# Patient Record
Sex: FEMALE | Race: ASIAN | URBAN - METROPOLITAN AREA
[De-identification: names, ages, dates, MRNs, and addresses within clinical notes are randomized per-mention and may not be internally consistent; named-entity substitution may affect disease eponyms.]

---

## 2023-01-05 PROBLEM — Z00.00 ENCOUNTER FOR PREVENTIVE HEALTH EXAMINATION: Status: ACTIVE | Noted: 2023-01-05

## 2023-02-01 ENCOUNTER — EMERGENCY (EMERGENCY)
Facility: HOSPITAL | Age: 38
LOS: 1 days | Discharge: HOME | End: 2023-02-01
Attending: EMERGENCY MEDICINE
Payer: MEDICAID

## 2023-02-01 VITALS
TEMPERATURE: 98 F | OXYGEN SATURATION: 98 % | RESPIRATION RATE: 18 BRPM | WEIGHT: 134.92 LBS | HEART RATE: 81 BPM | SYSTOLIC BLOOD PRESSURE: 118 MMHG | HEIGHT: 63.39 IN | DIASTOLIC BLOOD PRESSURE: 73 MMHG

## 2023-02-01 LAB
APTT BLD: 28.1 SEC — SIGNIFICANT CHANGE UP (ref 27.5–35.5)
BASOPHILS # BLD AUTO: 0.01 K/UL — SIGNIFICANT CHANGE UP (ref 0–0.2)
BASOPHILS NFR BLD AUTO: 0.1 % — SIGNIFICANT CHANGE UP (ref 0–2)
BLD GP AB SCN SERPL QL: NEGATIVE — SIGNIFICANT CHANGE UP
EOSINOPHIL # BLD AUTO: 0.13 K/UL — SIGNIFICANT CHANGE UP (ref 0–0.5)
EOSINOPHIL NFR BLD AUTO: 1.8 % — SIGNIFICANT CHANGE UP (ref 0–6)
HCT VFR BLD CALC: 38.3 % — SIGNIFICANT CHANGE UP (ref 34.5–45)
HGB BLD-MCNC: 12.8 G/DL — SIGNIFICANT CHANGE UP (ref 11.5–15.5)
IMM GRANULOCYTES NFR BLD AUTO: 0.1 % — SIGNIFICANT CHANGE UP (ref 0–0.9)
INR BLD: 0.94 RATIO — SIGNIFICANT CHANGE UP (ref 0.88–1.16)
LYMPHOCYTES # BLD AUTO: 2.08 K/UL — SIGNIFICANT CHANGE UP (ref 1–3.3)
LYMPHOCYTES # BLD AUTO: 28.7 % — SIGNIFICANT CHANGE UP (ref 13–44)
MCHC RBC-ENTMCNC: 32.2 PG — SIGNIFICANT CHANGE UP (ref 27–34)
MCHC RBC-ENTMCNC: 33.4 GM/DL — SIGNIFICANT CHANGE UP (ref 32–36)
MCV RBC AUTO: 96.5 FL — SIGNIFICANT CHANGE UP (ref 80–100)
MONOCYTES # BLD AUTO: 0.47 K/UL — SIGNIFICANT CHANGE UP (ref 0–0.9)
MONOCYTES NFR BLD AUTO: 6.5 % — SIGNIFICANT CHANGE UP (ref 2–14)
NEUTROPHILS # BLD AUTO: 4.56 K/UL — SIGNIFICANT CHANGE UP (ref 1.8–7.4)
NEUTROPHILS NFR BLD AUTO: 62.8 % — SIGNIFICANT CHANGE UP (ref 43–77)
NRBC # BLD: 0 /100 WBCS — SIGNIFICANT CHANGE UP (ref 0–0)
PLATELET # BLD AUTO: 215 K/UL — SIGNIFICANT CHANGE UP (ref 150–400)
PROTHROM AB SERPL-ACNC: 10.9 SEC — SIGNIFICANT CHANGE UP (ref 10.5–13.4)
RBC # BLD: 3.97 M/UL — SIGNIFICANT CHANGE UP (ref 3.8–5.2)
RBC # FLD: 12.7 % — SIGNIFICANT CHANGE UP (ref 10.3–14.5)
RH IG SCN BLD-IMP: POSITIVE — SIGNIFICANT CHANGE UP
WBC # BLD: 7.26 K/UL — SIGNIFICANT CHANGE UP (ref 3.8–10.5)
WBC # FLD AUTO: 7.26 K/UL — SIGNIFICANT CHANGE UP (ref 3.8–10.5)

## 2023-02-01 PROCEDURE — 99285 EMERGENCY DEPT VISIT HI MDM: CPT

## 2023-02-01 PROCEDURE — 76817 TRANSVAGINAL US OBSTETRIC: CPT | Mod: 26

## 2023-02-01 PROCEDURE — 93975 VASCULAR STUDY: CPT | Mod: 26

## 2023-02-01 NOTE — ED PROVIDER NOTE - NSFOLLOWUPINSTRUCTIONS_ED_ALL_ED_FT
You were seen today in the ED for abdominal pain and vaginal bleeding. You were seen today in the ED for abdominal pain and vaginal bleeding. You got a transvaginal ultrasound which did not show a fetal pole, consistent with a miscarriage. You were evaluated by the OBGYN team, and should follow up with the OBGYN clinic within the next 2 days. Please call the OBGYN clinic today, and schedule your appointment.  Return if you have any worsening abdominal pain, shortness of breath, lightheadedness, increase in bleeding or any other concerning symptoms. Take Tylenol for pain, and remain hydrated.    OBGYN clinic  [address] [place] [address]  [phone] You were seen today in the ED for abdominal pain and vaginal bleeding. You got a transvaginal ultrasound which did not show a fetal pole, consistent with a miscarriage. You were evaluated by the OBGYN team, and should follow up with the OBGYN clinic within the next 2 days. Please call the OBGYN clinic today, and schedule your appointment.  Return if you have any worsening abdominal pain, shortness of breath, lightheadedness, increase in bleeding or any other concerning symptoms. Take Tylenol for pain, and remain hydrated.    Dr. Flangaan, or Dr. Boggs  OBGYN clinic  [address] [place] [address]  [phone]

## 2023-02-01 NOTE — ED PROVIDER NOTE - CLINICAL SUMMARY MEDICAL DECISION MAKING FREE TEXT BOX
36 yo  presents to ed for vaginal bleeding and low abd cramping x2 days. r/o ectopic vs , will get type & screen, basic labs, hcg, TVUS.     TVUS showed intrauterine gestational sac w no fetal pole likely fetal demise. will do pelvic and eval os, and call OB. 38 yo  presents to ed for vaginal bleeding and low abd cramping x2 days. r/o ectopic vs , will get type & screen, basic labs, hcg, TVUS.     TVUS showed intrauterine gestational sac w no fetal pole likely fetal demise. will do pelvic and eval os, and call OB. pt stable.

## 2023-02-01 NOTE — ED PROVIDER NOTE - NS ED ROS FT
General: denies fever, chills  CV: denies chest pain, palpitations  Resp: denies difficulty breathing, cough  Abdominal: low abd cramping. denies nausea, vomiting, diarrhea  : vaginal bleeding  MSK: denies muscle aches, leg swelling  Neuro: denies headaches  Skin: denies rashes, bruises

## 2023-02-01 NOTE — ED PROVIDER NOTE - RAPID ASSESSMENT
37y F  11wks pregnant w/ no pertinent pmhx presents to the ED c/o vaginal bleeding x2days, and lower abd pain today. Pt states that the bleeding has gotten worse in triage. She was supposed to have first OBGYN appointment next week. Was told to come into ED today due to abd pain. LMP . Pt is well appearing in triage.    ISandhya (Margy) have documented this rapid assessment note under the dictation of Reynaldo Najera) which has been reviewed and affirmed to be accurate. Patient was seen as a QDOC patient. The patient will be seen and further worked up in the main emergency department and their care will be completed by the main emergency department team along with a thorough physical exam. Receiving team will follow up on labs, analgesia, any clinical imaging, reassess and disposition as clinically indicated, all decisions regarding the progression of care will be made at their discretion. 37y F  11wks pregnant w/ no pertinent pmhx presents to the ED c/o vaginal bleeding x2days, and lower abd pain today. She was supposed to have first OBGYN appointment next week, has not had an US for this pregnancy. Pt only used 1 pad today for bleeding.  LMP . Pt is well appearing in triage.    Sandhya BAXTER (Margy) have documented this rapid assessment note under the dictation of Reynaldo Najera) which has been reviewed and affirmed to be accurate. Patient was seen as a QDOC patient. The patient will be seen and further worked up in the main emergency department and their care will be completed by the main emergency department team along with a thorough physical exam. Receiving team will follow up on labs, analgesia, any clinical imaging, reassess and disposition as clinically indicated, all decisions regarding the progression of care will be made at their discretion.    I, Dr. Najera, personally performed the service described in the documentation recorded by the scribe in my presence, and it accurately and completely records my words and actions.

## 2023-02-01 NOTE — ED PROVIDER NOTE - PATIENT PORTAL LINK FT
You can access the FollowMyHealth Patient Portal offered by [facility] by registering at the following website: [email or web address]. By joining [facility]’s FollowMyHealth portal, you will also be able to view your health information using other applications (apps) compatible with our system.

## 2023-02-01 NOTE — ED PROVIDER NOTE - ATTENDING CONTRIBUTION TO CARE
Patient presents for evaluation of vaginal bleeding and lower abdominal cramping during first trimester pregnancy.  By last menstrual period she is 11 weeks pregnant.  Patient is hemodynamically stable in the ED and well-appearing, nontender on exam.  Transvaginal ultrasound obtained which shows gestational sac with estimated age of about 6 weeks, with no visible fetal pole or fetal heart rate, concerning for fetal demise given age of pregnancy.  Pelvic ultrasound shows small amount of blood with closed os.  We will consult OB/GYN for further evaluation and management of possible fetal demise, versus less likely early gestational age.

## 2023-02-01 NOTE — ED PROVIDER NOTE - PHYSICAL EXAMINATION
GENERAL: well appearing in no acute distress, non-toxic appearing  HEAD: normocephalic, atraumatic  CARDIAC: regular rate and rhythm, normal S1S2, no appreciable murmurs,   PULM: normal breath sounds, clear to ascultation bilaterally, no rales, rhonchi, wheezing  GI: abdomen nondistended, soft, tender suprapubic with no guarding, rebound tenderness  : XXX  MSK: no peripheral edema, no calf tenderness b/l  SKIN: well-perfused, extremities warm, no visible rashes  PSYCH: appropriate mood and affect GENERAL: well appearing in no acute distress, non-toxic appearing  HEAD: normocephalic, atraumatic  CARDIAC: regular rate and rhythm, normal S1S2, no appreciable murmurs,   PULM: normal breath sounds, clear to ascultation bilaterally, no rales, rhonchi, wheezing  GI: abdomen nondistended, soft, tender suprapubic with no guarding, rebound tenderness  : blood in cervical canal, external os minimally open  MSK: no peripheral edema, no calf tenderness b/l  SKIN: well-perfused, extremities warm, no visible rashes  PSYCH: appropriate mood and affect
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
## 2023-02-01 NOTE — ED PROVIDER NOTE - OBJECTIVE STATEMENT
37y F  11wks pregnant w/ no pertinent pmhx presents to the ED c/o vaginal bleeding x2days, and lower abd pain today. lmp nov 16. has not had obgyn apt yet, no confirmation of intrauterine pregnancy. bled through 2 pads while in the ED. also endorses low back pain. denies any f/c/cp/sob/ diarrhea.

## 2023-02-02 VITALS
SYSTOLIC BLOOD PRESSURE: 95 MMHG | DIASTOLIC BLOOD PRESSURE: 64 MMHG | OXYGEN SATURATION: 100 % | TEMPERATURE: 99 F | HEART RATE: 67 BPM | RESPIRATION RATE: 16 BRPM

## 2023-02-02 LAB
ALBUMIN SERPL ELPH-MCNC: 3.9 G/DL — SIGNIFICANT CHANGE UP (ref 3.3–5)
ALP SERPL-CCNC: 54 U/L — SIGNIFICANT CHANGE UP (ref 40–120)
ALT FLD-CCNC: 7 U/L — LOW (ref 10–45)
ANION GAP SERPL CALC-SCNC: 11 MMOL/L — SIGNIFICANT CHANGE UP (ref 5–17)
APPEARANCE UR: ABNORMAL
AST SERPL-CCNC: 21 U/L — SIGNIFICANT CHANGE UP (ref 10–40)
BACTERIA # UR AUTO: NEGATIVE — SIGNIFICANT CHANGE UP
BILIRUB SERPL-MCNC: 0.2 MG/DL — SIGNIFICANT CHANGE UP (ref 0.2–1.2)
BILIRUB UR-MCNC: NEGATIVE — SIGNIFICANT CHANGE UP
BUN SERPL-MCNC: 16 MG/DL — SIGNIFICANT CHANGE UP (ref 7–23)
CALCIUM SERPL-MCNC: 8.9 MG/DL — SIGNIFICANT CHANGE UP (ref 8.4–10.5)
CHLORIDE SERPL-SCNC: 108 MMOL/L — SIGNIFICANT CHANGE UP (ref 96–108)
CO2 SERPL-SCNC: 20 MMOL/L — LOW (ref 22–31)
COLOR SPEC: YELLOW — SIGNIFICANT CHANGE UP
CREAT SERPL-MCNC: 0.49 MG/DL — LOW (ref 0.5–1.3)
CULTURE RESULTS: SIGNIFICANT CHANGE UP
DIFF PNL FLD: ABNORMAL
EGFR: 126 ML/MIN/1.73M2 — SIGNIFICANT CHANGE UP
EPI CELLS # UR: 1 /HPF — SIGNIFICANT CHANGE UP
GLUCOSE SERPL-MCNC: 120 MG/DL — HIGH (ref 70–99)
GLUCOSE UR QL: NEGATIVE — SIGNIFICANT CHANGE UP
HCG SERPL-ACNC: 3396 MIU/ML — HIGH
HYALINE CASTS # UR AUTO: 0 /LPF — SIGNIFICANT CHANGE UP (ref 0–2)
KETONES UR-MCNC: NEGATIVE — SIGNIFICANT CHANGE UP
LEUKOCYTE ESTERASE UR-ACNC: NEGATIVE — SIGNIFICANT CHANGE UP
NITRITE UR-MCNC: NEGATIVE — SIGNIFICANT CHANGE UP
PH UR: 5.5 — SIGNIFICANT CHANGE UP (ref 5–8)
POTASSIUM SERPL-MCNC: 4 MMOL/L — SIGNIFICANT CHANGE UP (ref 3.5–5.3)
POTASSIUM SERPL-SCNC: 4 MMOL/L — SIGNIFICANT CHANGE UP (ref 3.5–5.3)
PROT SERPL-MCNC: 6.6 G/DL — SIGNIFICANT CHANGE UP (ref 6–8.3)
PROT UR-MCNC: SIGNIFICANT CHANGE UP
RBC CASTS # UR COMP ASSIST: 317 /HPF — HIGH (ref 0–4)
SODIUM SERPL-SCNC: 139 MMOL/L — SIGNIFICANT CHANGE UP (ref 135–145)
SP GR SPEC: 1.03 — HIGH (ref 1.01–1.02)
SPECIMEN SOURCE: SIGNIFICANT CHANGE UP
UROBILINOGEN FLD QL: NEGATIVE — SIGNIFICANT CHANGE UP
WBC UR QL: 1 /HPF — SIGNIFICANT CHANGE UP (ref 0–5)

## 2023-02-02 PROCEDURE — 81001 URINALYSIS AUTO W/SCOPE: CPT

## 2023-02-02 PROCEDURE — 86901 BLOOD TYPING SEROLOGIC RH(D): CPT

## 2023-02-02 PROCEDURE — 86900 BLOOD TYPING SEROLOGIC ABO: CPT

## 2023-02-02 PROCEDURE — 85610 PROTHROMBIN TIME: CPT

## 2023-02-02 PROCEDURE — 84702 CHORIONIC GONADOTROPIN TEST: CPT

## 2023-02-02 PROCEDURE — 86850 RBC ANTIBODY SCREEN: CPT

## 2023-02-02 PROCEDURE — 87086 URINE CULTURE/COLONY COUNT: CPT

## 2023-02-02 PROCEDURE — 76817 TRANSVAGINAL US OBSTETRIC: CPT

## 2023-02-02 PROCEDURE — 85025 COMPLETE CBC W/AUTO DIFF WBC: CPT

## 2023-02-02 PROCEDURE — 85730 THROMBOPLASTIN TIME PARTIAL: CPT

## 2023-02-02 PROCEDURE — 80053 COMPREHEN METABOLIC PANEL: CPT

## 2023-02-02 PROCEDURE — 99285 EMERGENCY DEPT VISIT HI MDM: CPT | Mod: 25

## 2023-02-02 PROCEDURE — 93975 VASCULAR STUDY: CPT

## 2023-02-02 RX ORDER — ACETAMINOPHEN 500 MG
650 TABLET ORAL ONCE
Refills: 0 | Status: COMPLETED | OUTPATIENT
Start: 2023-02-02 | End: 2023-02-02

## 2023-02-02 RX ADMIN — Medication 650 MILLIGRAM(S): at 04:58

## 2023-02-02 NOTE — CONSULT NOTE ADULT - SUBJECTIVE AND OBJECTIVE BOX
HPI: 38y/o   Last Menstrual Period: 2022 @ 11w by LMP presenting with vaginal Bleeding and abdominal pain over the course of the last 2 days. Patient states that that she has used 3-4 pads over the last two days with progressively increased clot passage. The patient was supposed to have an appointment in the [facility] at [address] [place] [address] to establish PNC on 2023, however, the patient began to experience heavy vaginal bleeding and came to the ED for evaluation. Patient denies syncope, HA, lightheadedness or dizziness, though she endorses mild weakness. Patient denies f/c/n/v/d/cp/sob.      PMHX; denies  PSHX; denies  POBHX;   PGYNHX: denies hx of fibroids, endometriosis, abnormal pap smears, ovarian cysts or STDs.   SOCIAL: denies hx of anxiety or depression  Allergies: No Known Allergies  MEDS: denies      Vital Signs Last 24 Hrs  T(C): 36.4 (2023 21:14), Max: 36.4 (2023 21:14)  T(F): 97.6 (2023 21:14), Max: 97.6 (2023 21:14)  HR: 81 (2023 21:14) (81 - 81)  BP: 118/73 (2023 21:14) (118/73 - 118/73)  RR: 18 (2023 21:14) (18 - 18)  SpO2: 98% (:14) (98% - 98%)         PHYSICAL EXAM:  ABDOMEN: Soft, Mildly tender to deep palpation in lower abdomen. Nondistended;  EXTREMITIES:  2+ Peripheral Pulses, No clubbing, cyanosis, or edema  PELVIC:        EXTERNAL GENITALIA: Normal. No rashes or lesions noted.         VAGINA: healthy pink mucosa, no gross lesions, no discharge noted, about 50cc of blood in vault on bimanual exam.          CERVIX: no lesions. prominent cervix on exam though cervical os closed on bimanual exam. no CMT noted.        UTERUS: normal size, nontender, mobile        ADNEXA: no adnexal masses or tenderness appreciated.    LABS:    Type + Screen (23 @ 22:56)   ABO Interpretation: A   Rh Interpretation: Positive   Antibody Screen: Negative     HCG Quantitative, Serum (23 @ 22:55)   HCG Quantitative, Serum: 3396.0                        12.8   7.26  )-----------( 215      ( 2023 22:55 )             38.3         139  |  108  |  16  ----------------------------<  120<H>  4.0   |  20<L>  |  0.49<L>    Ca    8.9      2023 22:55    TPro  6.6  /  Alb  3.9  /  TBili  0.2  /  DBili  x   /  AST  21  /  ALT  7<L>  /  AlkPhos  54      Urinalysis Basic - ( 2023 00:09 )    Color: Yellow / Appearance: Slightly Turbid / S.027 / pH: x  Gluc: x / Ketone: Negative  / Bili: Negative / Urobili: Negative   Blood: x / Protein: Trace / Nitrite: Negative   Leuk Esterase: Negative / RBC: 317 /hpf / WBC 1 /HPF   Sq Epi: x / Non Sq Epi: 1 /hpf / Bacteria: Negative          RADIOLOGY STUDIES:  < from: US Doppler Pelvis (23 @ 23:13) >  ACC: [id number] EXAM:  US DPLX PELVIC   ORDERED BY:  ANUPAMA MARTINEZ     ACC: [id number] EXAM:  US OB TRANSVAGINAL   ORDERED BY:  ANUPAMA MARTINEZ     PROCEDURE DATE:  2023          INTERPRETATION:  CLINICAL INFORMATION: Pregnant, vaginal bleeding    LMP: 2022    Estimated Gestational Age by LMP: 11 weeks 0 day    COMPARISON: None available.    Endovaginal and transabdominal pelvic sonogram. Color and Spectral   Doppler was performed.    FINDINGS:  Uterus: Single intrauterine gestation.    Gestational Sac Size (mean): 2.0 cm  Gestations Sac Shape : Normal.  Crown Rump Length: Not visualized  Estimated Gestational Age: 6 weeks 3 days based on gestational sac size  Yolk Sac: Not seen  Fetal Heart Rate: No cardiac activity present    Right ovary: 3.1 x 1.3 x 2.3 cm. Within normal limits. Normal arterial   and venous waveforms.  Left ovary: 2.2 x 1.2 x 1.3 cm. Within normal limits. Normal arterial and   venous waveforms.    Fluid: Small amount of fluid present within the endocervicalcanal.    IMPRESSION:  Gestational sac visualized without yolk sac/fetal pole or evidence of   fetal cardiac activity; findings are concerning for fetal demise.   Follow-up ultrasound examination with serum beta hCG measurements   recommended.    ---End of Report ---           SAMI HAYSE MD; Resident Radiologist  This document has been electronically signed.   KIKI DAY MD; Attending Radiologist  This document has been electronically signed. 2023 11:58PM    < end of copied text >

## 2023-02-02 NOTE — CONSULT NOTE ADULT - ASSESSMENT
36y/o   Last Menstrual Period: 2022 @ 11w by LMP presenting with 2 day hx of vaginal Bleeding and abdominal pain with concern for inevitable AB. VS wnl, H/H: 12.8/38.3. Patient's TVUS is significant for a GS measuring at 6w3d gestation with no YS or FP seen, and beta HCG measuring 3396. Cervix is closed on exam with passage of small clots, though no visible tissue resembling products of conception. Given discrepancy in beta HCG and US findings in the setting of abdominal pain and vaginal bleeding, patient is likely experiencing an inevitable AB. No acute surgical interventions indicated at this time.     -Patient to receive 800 mcg of cytotec buccally to assist with passage of products of conception.  -Patient to follow up in clinic at [address] [place] [address] either Thursday or Friday of this week.    [address] [place] [address] [address]  [place], [place] [id number] [phone]   -Patient counseled on likelihood of increased bleeding and cramping associated with cytotec use.   -No rhogam needed at this time as patient is Rh +.  -Tylenol and Ibuprofen to be taken for pain control. May write prescription for 3-5 pills of oxycodone for breakthrough pain.     All of patient's questions answered to her and her partner's verbal satisfaction. Plan shared with ED team.    Gladys Aviles, PGY2   d/w Dr. Echevarria, Attending 36y/o   Last Menstrual Period: 2022 @ 11w by LMP presenting with 2 day hx of vaginal Bleeding and abdominal pain with concern for inevitable AB. VS wnl, H/H: 12.8/38.3. Patient's TVUS is significant for a GS measuring at 6w3d gestation with no YS or FP seen, and beta HCG measuring 3396. Cervix is closed on exam with passage of small clots, though no visible tissue resembling products of conception. Given discrepancy in beta HCG and US findings in the setting of abdominal pain and vaginal bleeding, patient is likely experiencing an inevitable AB. No acute surgical interventions indicated at this time.     -Patient to receive 800 mcg of cytotec buccally to assist with passage of products of conception.  -Patient to follow up in clinic at [address] [place] [address] either Thursday or Friday of this week.    [address] [place] [address] [address]  [place], [place] [id number] [phone]   -Patient counseled on likelihood of increased bleeding and cramping associated with cytotec use.   -No rhogam needed at this time as patient is Rh +.  -Tylenol and Ibuprofen to be taken for pain control. May write prescription for 3-5 pills of oxycodone for breakthrough pain.     All of patient's questions answered to her and her partner's verbal satisfaction. Plan shared with ED team.    Gladys Aviles, PGY2   d/w Dr. Echevarria, Attending      ATTYO P0 with LMP 11 wks ago but gestational sac c/w 6 wk gestation with likely inevitable Ab who was given cytotec and will F/U with Family planning this week.  -Until beta drops this is a PUL and requires F/U beta HCG

## 2023-02-02 NOTE — ED ADULT NURSE REASSESSMENT NOTE - NS ED NURSE REASSESS COMMENT FT1
Patient A&OX3, skin warm dry and intact, patient given meds per EMR, VS as documented in flowsheet. Pads provided. Awaiting OB recs. Will continue to monitor.

## 2023-02-03 ENCOUNTER — APPOINTMENT (OUTPATIENT)
Dept: OBGYN | Facility: CLINIC | Age: 38
End: 2023-02-03
Payer: MEDICAID

## 2023-02-03 VITALS
DIASTOLIC BLOOD PRESSURE: 70 MMHG | WEIGHT: 135 LBS | BODY MASS INDEX: 23.92 KG/M2 | HEIGHT: 63 IN | SYSTOLIC BLOOD PRESSURE: 101 MMHG

## 2023-02-03 DIAGNOSIS — Z78.9 OTHER SPECIFIED HEALTH STATUS: ICD-10-CM

## 2023-02-03 DIAGNOSIS — Z83.79 FAMILY HISTORY OF OTHER DISEASES OF THE DIGESTIVE SYSTEM: ICD-10-CM

## 2023-02-03 PROCEDURE — 76817 TRANSVAGINAL US OBSTETRIC: CPT

## 2023-02-03 PROCEDURE — 99203 OFFICE O/P NEW LOW 30 MIN: CPT | Mod: TH,25

## 2023-02-03 NOTE — HISTORY OF PRESENT ILLNESS
[FreeTextEntry1] : 37 year old  LMP 2022 (EGA by LMP 11+2) presents for consultation for missed . \par \par Patient was seen in [facility] emergency room 22 where she was diagnosed with an inevitable . Patient was prescribed 800mcg of Buccal Cytotec to help pass the remaining products. Patient was instructed to f/u in clinic for further management. Since being discharged from the hospital patient endorses minimal bleeding. Denies abdominal pain.\par \par OB: \par \par GYN: Denies cysts, fibroids, abnormal paps, STIs, endometriosis\par \par PMHx: Denies\par Meds: Denies\par Surgeries: Denies\par All: NKDA\par \par Occupation: Restaurant \par COVID: COVID vaccinated and boosted

## 2023-02-03 NOTE — PLAN
[FreeTextEntry1] : 37 year old  presents to office s/p ED visit on 23 where patient was diagnosed with miscarriage. Patient was given 800mcg of buccal cytotec to help facilitate passing the remaining products. patient presents to confirm passage of pregnancy.\par \par -sonogram showed no remaining pregnancy; however, still blood clots in the endometrial cavity\par -patient offered cytotec to pass the rest of the blood, patient declined additional medication\par -patient to f/u in one week to confrim passage of blood products\par -patient declined BC at this time\par \par patient seen and evaluated w Dr Boggs\par JAMAAL Dick PGY2

## 2023-02-04 LAB
C TRACH RRNA SPEC QL NAA+PROBE: NOT DETECTED
N GONORRHOEA RRNA SPEC QL NAA+PROBE: NOT DETECTED
SOURCE AMPLIFICATION: NORMAL

## 2023-02-07 ENCOUNTER — APPOINTMENT (OUTPATIENT)
Dept: OBGYN | Facility: CLINIC | Age: 38
End: 2023-02-07

## 2023-02-07 ENCOUNTER — TRANSCRIPTION ENCOUNTER (OUTPATIENT)
Age: 38
End: 2023-02-07

## 2023-02-07 ENCOUNTER — NON-APPOINTMENT (OUTPATIENT)
Age: 38
End: 2023-02-07

## 2023-02-07 ENCOUNTER — APPOINTMENT (OUTPATIENT)
Dept: OBGYN | Facility: CLINIC | Age: 38
End: 2023-02-07
Payer: MEDICAID

## 2023-02-07 DIAGNOSIS — R30.9 PAINFUL MICTURITION, UNSPECIFIED: ICD-10-CM

## 2023-02-07 PROCEDURE — 99213 OFFICE O/P EST LOW 20 MIN: CPT | Mod: 95

## 2023-02-08 ENCOUNTER — APPOINTMENT (OUTPATIENT)
Dept: OBGYN | Facility: CLINIC | Age: 38
End: 2023-02-08

## 2023-02-10 ENCOUNTER — APPOINTMENT (OUTPATIENT)
Dept: OBGYN | Facility: CLINIC | Age: 38
End: 2023-02-10
Payer: MEDICAID

## 2023-02-10 VITALS
SYSTOLIC BLOOD PRESSURE: 100 MMHG | DIASTOLIC BLOOD PRESSURE: 60 MMHG | BODY MASS INDEX: 23.94 KG/M2 | WEIGHT: 135.13 LBS | HEIGHT: 63 IN

## 2023-02-10 DIAGNOSIS — O03.4 INCOMPLETE SPONTANEOUS ABORTION W/OUT COMPLICATION: ICD-10-CM

## 2023-02-10 PROCEDURE — 76817 TRANSVAGINAL US OBSTETRIC: CPT

## 2023-02-10 PROCEDURE — 99213 OFFICE O/P EST LOW 20 MIN: CPT | Mod: TH,25

## 2023-02-10 RX ORDER — MISOPROSTOL 200 UG/1
200 TABLET ORAL
Qty: 8 | Refills: 0 | Status: ACTIVE | COMMUNITY
Start: 2023-02-10 | End: 1900-01-01

## 2023-02-10 NOTE — HISTORY OF PRESENT ILLNESS
[FreeTextEntry1] : 36 y/o  s/p missed ab s/p medical management on  presents for follow up\par \par intermittent cramping \par Had some bleeding over the week\par

## 2023-02-10 NOTE — PROCEDURE
[Transvaginal Ultrasound] : transvaginal ultrasound [FreeTextEntry3] : EMT: 1.26cm, + flow, concern for retained POC

## 2023-02-10 NOTE — PLAN
[FreeTextEntry1] : 36 y/o  s/p missed ab s/p medical management on  presents for follow up\par \par Still has retained POC in the uterus \par Rx for 800mcg of misoprostol x2 sent to pharmacy\par follow up HCG\par discussed that if meds do not work, will need surgery \par follow up in 1 week

## 2023-02-13 LAB — HCG SERPL-MCNC: 76 MIU/ML

## 2023-02-17 ENCOUNTER — APPOINTMENT (OUTPATIENT)
Dept: OBGYN | Facility: CLINIC | Age: 38
End: 2023-02-17
Payer: MEDICAID

## 2023-02-17 VITALS
SYSTOLIC BLOOD PRESSURE: 109 MMHG | DIASTOLIC BLOOD PRESSURE: 76 MMHG | HEIGHT: 63 IN | BODY MASS INDEX: 23.92 KG/M2 | WEIGHT: 135 LBS

## 2023-02-17 DIAGNOSIS — O02.1 MISSED ABORTION: ICD-10-CM

## 2023-02-17 DIAGNOSIS — Z09 ENCOUNTER FOR FOLLOW-UP EXAMINATION AFTER COMPLETED TREATMENT FOR CONDITIONS OTHER THAN MALIGNANT NEOPLASM: ICD-10-CM

## 2023-02-17 PROCEDURE — 99212 OFFICE O/P EST SF 10 MIN: CPT | Mod: TH,25

## 2023-02-17 PROCEDURE — 76830 TRANSVAGINAL US NON-OB: CPT

## 2023-02-19 LAB — HCG SERPL-MCNC: 18 MIU/ML

## 2023-06-27 ENCOUNTER — APPOINTMENT (OUTPATIENT)
Dept: GASTROENTEROLOGY | Facility: CLINIC | Age: 38
End: 2023-06-27
